# Patient Record
Sex: MALE | Race: WHITE | NOT HISPANIC OR LATINO | Employment: FULL TIME | URBAN - METROPOLITAN AREA
[De-identification: names, ages, dates, MRNs, and addresses within clinical notes are randomized per-mention and may not be internally consistent; named-entity substitution may affect disease eponyms.]

---

## 2019-08-16 ENCOUNTER — OFFICE VISIT (OUTPATIENT)
Dept: GASTROENTEROLOGY | Facility: CLINIC | Age: 39
End: 2019-08-16
Payer: COMMERCIAL

## 2019-08-16 VITALS
HEART RATE: 52 BPM | WEIGHT: 282 LBS | DIASTOLIC BLOOD PRESSURE: 80 MMHG | HEIGHT: 77 IN | SYSTOLIC BLOOD PRESSURE: 160 MMHG | BODY MASS INDEX: 33.3 KG/M2

## 2019-08-16 DIAGNOSIS — Z12.11 COLON CANCER SCREENING: ICD-10-CM

## 2019-08-16 DIAGNOSIS — R10.13 EPIGASTRIC PAIN: ICD-10-CM

## 2019-08-16 DIAGNOSIS — T88.7XXA SIDE EFFECT OF DRUG: ICD-10-CM

## 2019-08-16 DIAGNOSIS — K59.00 CONSTIPATION, UNSPECIFIED CONSTIPATION TYPE: ICD-10-CM

## 2019-08-16 DIAGNOSIS — R12 HEARTBURN: Primary | ICD-10-CM

## 2019-08-16 PROBLEM — F17.200 CURRENT EVERY DAY SMOKER: Status: ACTIVE | Noted: 2019-08-16

## 2019-08-16 PROCEDURE — 99244 OFF/OP CNSLTJ NEW/EST MOD 40: CPT | Performed by: INTERNAL MEDICINE

## 2019-08-16 RX ORDER — MULTIVITAMIN
1 CAPSULE ORAL DAILY
COMMUNITY

## 2019-08-16 RX ORDER — PANTOPRAZOLE SODIUM 40 MG/1
40 TABLET, DELAYED RELEASE ORAL DAILY
COMMUNITY
End: 2019-08-16 | Stop reason: SDUPTHER

## 2019-08-16 RX ORDER — CHLORAL HYDRATE 500 MG
1000 CAPSULE ORAL DAILY
COMMUNITY

## 2019-08-16 RX ORDER — PANTOPRAZOLE SODIUM 40 MG/1
40 TABLET, DELAYED RELEASE ORAL DAILY
Start: 2019-08-16 | End: 2019-10-23 | Stop reason: ALTCHOICE

## 2019-08-16 NOTE — PATIENT INSTRUCTIONS
Gastroesophageal Reflux Disease   WHAT YOU NEED TO KNOW:   Gastroesophageal reflux occurs when acid and food in the stomach back up into the esophagus  Gastroesophageal reflux disease (GERD) is reflux that occurs more than twice a week for a few weeks  It usually causes heartburn and other symptoms  GERD can cause other health problems over time if it is not treated  DISCHARGE INSTRUCTIONS:   Return to the emergency department if:   · You feel full and cannot burp or vomit  · You have severe chest pain and sudden trouble breathing  · Your bowel movements are black, bloody, or tarry-looking  · Your vomit looks like coffee grounds or has blood in it  Contact your healthcare provider if:   · You vomit large amounts, or you vomit often  · You have trouble breathing after you vomit  · You have trouble swallowing, or pain with swallowing  · You are losing weight without trying  · Your symptoms get worse or do not improve with treatment  · You have questions or concerns about your condition or care  Medicines:   · Medicines  are used to decrease stomach acid  Medicine may also be used to help your lower esophageal sphincter and stomach contract (tighten) more  · Take your medicine as directed  Contact your healthcare provider if you think your medicine is not helping or if you have side effects  Tell him of her if you are allergic to any medicine  Keep a list of the medicines, vitamins, and herbs you take  Include the amounts, and when and why you take them  Bring the list or the pill bottles to follow-up visits  Carry your medicine list with you in case of an emergency  Manage GERD:   · Do not have foods or drinks that may increase heartburn  These include chocolate, peppermint, fried or fatty foods, drinks that contain caffeine, or carbonated drinks (soda)  Other foods include spicy foods, onions, tomatoes, and tomato-based foods   Do not have foods or drinks that can irritate your esophagus, such as citrus fruits, juices, and alcohol  · Do not eat large meals  When you eat a lot of food at one time, your stomach needs more acid to digest it  Eat 6 small meals each day instead of 3 large ones, and eat slowly  Do not eat meals 2 to 3 hours before bedtime  · Elevate the head of your bed  Place 6-inch blocks under the head of your bed frame  You may also use more than one pillow under your head and shoulders while you sleep  · Maintain a healthy weight  If you are overweight, weight loss may help relieve symptoms of GERD  · Do not smoke  Smoking weakens the lower esophageal sphincter and increases the risk of GERD  Ask your healthcare provider for information if you currently smoke and need help to quit  E-cigarettes or smokeless tobacco still contain nicotine  Talk to your healthcare provider before you use these products  · Do not wear clothing that is tight around your waist   Tight clothing can put pressure on your stomach and cause or worsen GERD symptoms  Follow up with your healthcare provider as directed:  Write down your questions so you remember to ask them during your visits  © 2017 [facility] Information is for End User's use only and may not be sold, redistributed or otherwise used for commercial purposes  All illustrations and images included in CareNotes® are the copyrighted property of [employer]  or [employer]un [employer]  The above information is an  only  It is not intended as medical advice for individual conditions or treatments  Talk to your doctor, nurse or pharmacist before following any medical regimen to see if it is safe and effective for you

## 2019-08-16 NOTE — LETTER
August 16, 2019     Marck Puente, [address] [facility] [address] [facility]    Patient: Lor Guillermo   YOB: 1980   Date of Visit: 8/16/2019       Dear Dr Keyanna Rosa:    Thank you for referring Lor Guillermo to me for evaluation  Below are my notes for this consultation  If you have questions, please do not hesitate to call me  I look forward to following your patient along with you  Sincerely,        Juan Jose Mendoza MD        CC: No Recipients  Juan Jose Mendoza MD  8/16/2019  4:06 PM  Sign at close encounter    [facility] - Outpatient Consultation  Lor Guillermo 43 y o  male MRN: [id number]  Encounter: [id number]    ASSESSMENT AND PLAN:      1  Heartburn  27-year-old male with 6 weeks of new onset heartburn and epigastric pain  No NSAIDs  It appears that this was all triggered by a viral event  Likely some reflux  However, given his worsening pain, we will proceed with upper endoscopy to rule out peptic ulcer disease, esophagitis  Will also obtain an ultrasound and blood work to further evaluate his pain, to rule out biliary etiologies  - pantoprazole (PROTONIX) 40 mg tablet; Take 1 tablet (40 mg total) by mouth daily    2  Epigastric pain    - pantoprazole (PROTONIX) 40 mg tablet; Take 1 tablet (40 mg total) by mouth daily  - Comprehensive metabolic panel; Future  - CBC and differential; Future  - Lipase; Future    3  Side effect of drug  I do not think that his fever chills and the numbness were due to the Protonix  Especially since he has been on it back for the past 4 days without any issues  At this point, I recommend that we can continue it until the endoscopy  If he experiences any other new symptoms, we can switch the pantoprazole to omeprazole  4  Constipation, unspecified constipation type  Intermittent constipation issues  Does not wish to take any medicines  Increased water and high-fiber diet recommended      5  Colon cancer screening  Average risk      Followup Appointment[de-identified]  2 months  ______________________________________________________________________    Chief Complaint   Patient presents with    Heartburn       HPI:   Vito Saldana is a 43y o  year old male who presents today as a new patient at the request of Dr Shala Patrick for heartburn  Patient states that for the past year he has been under lot of stress as his in-laws moved in with him  However, his weight has been stable  He intermittently remembers taking some Gaviscon or Tums as needed over the past few years, possibly a few times a year  However, for the past 6 weeks, he has noticed worsening heartburn  It all started after a barbecue on July 4th  The heartburn is associated with severe epigastric sharp abdominal pain, radiating to the right upper quadrant  No change with movement or eating  Improved on pantoprazole  No associated nausea or vomiting  Intermittently gets constipated, was given MiraLax in the past but does not take it  No GI bleeding  Of note, he is primary care doctor gave him pantoprazole  Afterwards, he had a couple episodes where he had 3 days of fevers and chills  He also had couple days in the past 6 weeks of numbness in his face      Historical Information   Past Medical History:   Diagnosis Date    GERD (gastroesophageal reflux disease)     Smoker      Past Surgical History:   Procedure Laterality Date    LASIK      WISDOM TOOTH EXTRACTION       Social History     Substance and Sexual Activity   Alcohol Use Yes    Frequency: 2-4 times a month    Drinks per session: 1 or 2    Binge frequency: Less than monthly     Social History     Substance and Sexual Activity   Drug Use Never     Social History     Tobacco Use   Smoking Status Light Tobacco Smoker    Types: Cigars   Smokeless Tobacco Never Used     Family History   Problem Relation Age of Onset    Diabetes Mother     Colon cancer Neg Hx     Colon polyps Neg Hx Meds/Allergies     Current Outpatient Medications:     Multiple Vitamin (MULTIVITAMIN) capsule    Omega-3 Fatty Acids (FISH OIL) 1,000 mg    pantoprazole (PROTONIX) 40 mg tablet    No Known Allergies    PHYSICAL EXAM:    Blood pressure 160/80, pulse (!) 52, height 6' 5" (1 956 m), weight 128 kg (282 lb)  Body mass index is 33 44 kg/m²  General Appearance: NAD, cooperative, alert  Eyes: Anicteric, PERRLA, EOMI  ENT:  Normocephalic, atraumatic, normal mucosa  Neck:  Supple, symmetrical, trachea midline  Resp:  Clear to auscultation bilaterally; no rales, rhonchi or wheezing; respirations unlabored   CV:  S1 S2, Regular rate and rhythm; no murmur, rub, or gallop  GI:  Soft, non-tender, non-distended; normal bowel sounds; no masses, no organomegaly   Rectal: Deferred  :  Deferred   Musculoskeletal: No cyanosis, clubbing or edema  Normal ROM  Skin:  No jaundice, rashes, or lesions   Heme/Lymph: No palpable cervical lymphadenopathy  Psych: Normal affect, good eye contact  Neuro: No gross deficits, AAOx3    Lab Results:   Normal CBC    Radiology Results:   No results found  REVIEW OF SYSTEMS:    CONSTITUTIONAL:  2 days of fever, chills, rigors  5 lb weight loss  HEENT: No earache or tinnitus  Denies hearing loss or visual disturbances  CARDIOVASCULAR: No chest pain or palpitations  RESPIRATORY: Denies any cough, hemoptysis, shortness of breath or dyspnea on exertion  GASTROINTESTINAL: As noted in the History of Present Illness  GENITOURINARY: No problems with urination  Denies any hematuria or dysuria  NEUROLOGIC: No dizziness or vertigo, denies headaches  MUSCULOSKELETAL: Denies any muscle or joint pain  SKIN: Denies skin rashes or itching  ENDOCRINE: Denies excessive thirst  Denies intolerance to heat or cold  PSYCHOSOCIAL: Denies depression or anxiety  Denies any recent memory loss

## 2019-08-16 NOTE — PROGRESS NOTES
[address] [facility] - Outpatient Consultation  Talisha Cordova 43 y o  male MRN: [id number]  Encounter: [id number]    ASSESSMENT AND PLAN:      1  Heartburn  67-year-old male with 6 weeks of new onset heartburn and epigastric pain  No NSAIDs  It appears that this was all triggered by a viral event  Likely some reflux  However, given his worsening pain, we will proceed with upper endoscopy to rule out peptic ulcer disease, esophagitis  Will also obtain an ultrasound and blood work to further evaluate his pain, to rule out biliary etiologies  - pantoprazole (PROTONIX) 40 mg tablet; Take 1 tablet (40 mg total) by mouth daily    2  Epigastric pain    - pantoprazole (PROTONIX) 40 mg tablet; Take 1 tablet (40 mg total) by mouth daily  - Comprehensive metabolic panel; Future  - CBC and differential; Future  - Lipase; Future    3  Side effect of drug  I do not think that his fever chills and the numbness were due to the Protonix  Especially since he has been on it back for the past 4 days without any issues  At this point, I recommend that we can continue it until the endoscopy  If he experiences any other new symptoms, we can switch the pantoprazole to omeprazole  4  Constipation, unspecified constipation type  Intermittent constipation issues  Does not wish to take any medicines  Increased water and high-fiber diet recommended  5  Colon cancer screening  Average risk      Followup Appointment[de-identified]  2 months  ______________________________________________________________________    Chief Complaint   Patient presents with    Heartburn       HPI:   Talisha Cordova is a 43y o  year old male who presents today as a new patient at the request of Dr Louie Hamlin for heartburn  Patient states that for the past year he has been under lot of stress as his in-laws moved in with him  However, his weight has been stable    He intermittently remembers taking some Gaviscon or Tums as needed over the past few years, possibly a few times a year  However, for the past 6 weeks, he has noticed worsening heartburn  It all started after a barbecue on July 4th  The heartburn is associated with severe epigastric sharp abdominal pain, radiating to the right upper quadrant  No change with movement or eating  Improved on pantoprazole  No associated nausea or vomiting  Intermittently gets constipated, was given MiraLax in the past but does not take it  No GI bleeding  Of note, he is primary care doctor gave him pantoprazole  Afterwards, he had a couple episodes where he had 3 days of fevers and chills  He also had couple days in the past 6 weeks of numbness in his face  Historical Information   Past Medical History:   Diagnosis Date    GERD (gastroesophageal reflux disease)     Smoker      Past Surgical History:   Procedure Laterality Date    LASIK      WISDOM TOOTH EXTRACTION       Social History     Substance and Sexual Activity   Alcohol Use Yes    Frequency: 2-4 times a month    Drinks per session: 1 or 2    Binge frequency: Less than monthly     Social History     Substance and Sexual Activity   Drug Use Never     Social History     Tobacco Use   Smoking Status Light Tobacco Smoker    Types: Cigars   Smokeless Tobacco Never Used     Family History   Problem Relation Age of Onset    Diabetes Mother     Colon cancer Neg Hx     Colon polyps Neg Hx        Meds/Allergies     Current Outpatient Medications:     Multiple Vitamin (MULTIVITAMIN) capsule    Omega-3 Fatty Acids (FISH OIL) 1,000 mg    pantoprazole (PROTONIX) 40 mg tablet    No Known Allergies    PHYSICAL EXAM:    Blood pressure 160/80, pulse (!) 52, height 6' 5" (1 956 m), weight 128 kg (282 lb)  Body mass index is 33 44 kg/m²  General Appearance: NAD, cooperative, alert  Eyes: Anicteric, PERRLA, EOMI  ENT:  Normocephalic, atraumatic, normal mucosa      Neck:  Supple, symmetrical, trachea midline  Resp:  Clear to auscultation bilaterally; no rales, rhonchi or wheezing; respirations unlabored   CV:  S1 S2, Regular rate and rhythm; no murmur, rub, or gallop  GI:  Soft, non-tender, non-distended; normal bowel sounds; no masses, no organomegaly   Rectal: Deferred  :  Deferred   Musculoskeletal: No cyanosis, clubbing or edema  Normal ROM  Skin:  No jaundice, rashes, or lesions   Heme/Lymph: No palpable cervical lymphadenopathy  Psych: Normal affect, good eye contact  Neuro: No gross deficits, AAOx3    Lab Results:   Normal CBC    Radiology Results:   No results found  REVIEW OF SYSTEMS:    CONSTITUTIONAL:  2 days of fever, chills, rigors  5 lb weight loss  HEENT: No earache or tinnitus  Denies hearing loss or visual disturbances  CARDIOVASCULAR: No chest pain or palpitations  RESPIRATORY: Denies any cough, hemoptysis, shortness of breath or dyspnea on exertion  GASTROINTESTINAL: As noted in the History of Present Illness  GENITOURINARY: No problems with urination  Denies any hematuria or dysuria  NEUROLOGIC: No dizziness or vertigo, denies headaches  MUSCULOSKELETAL: Denies any muscle or joint pain  SKIN: Denies skin rashes or itching  ENDOCRINE: Denies excessive thirst  Denies intolerance to heat or cold  PSYCHOSOCIAL: Denies depression or anxiety  Denies any recent memory loss

## 2019-08-19 ENCOUNTER — TELEPHONE (OUTPATIENT)
Dept: GASTROENTEROLOGY | Facility: CLINIC | Age: 39
End: 2019-08-19

## 2019-08-19 DIAGNOSIS — R10.13 EPIGASTRIC PAIN: Primary | ICD-10-CM

## 2019-08-19 NOTE — TELEPHONE ENCOUNTER
Ultrasound order prepared and faxed to [facility] pt registration, corrected lab orders placed with [facility] as resulting lab

## 2019-08-19 NOTE — TELEPHONE ENCOUNTER
Pt left  mssg stating he was in Fri and told he needs to do blood work for his liver; asks for [phone] to let him know if he needs to fast      Called Pt back to advise no fasting required  Pt will go to [facility] for Labs (new order to be re-sent)  Pt will go to [facility] for US; doesn't know if he needs a pre cert or not  Provided  Central Scheduling ph #  He will plan on going end of this wk for testing

## 2019-08-29 LAB
ALBUMIN SERPL-MCNC: 4.3 G/DL (ref 3.6–5.1)
ALBUMIN/GLOB SERPL: 1.9 (CALC) (ref 1–2.5)
ALP SERPL-CCNC: 50 U/L (ref 40–115)
ALT SERPL-CCNC: 11 U/L (ref 9–46)
AST SERPL-CCNC: 19 U/L (ref 10–40)
BASOPHILS # BLD AUTO: 48 CELLS/UL (ref 0–200)
BASOPHILS NFR BLD AUTO: 0.7 %
BILIRUB SERPL-MCNC: 0.7 MG/DL (ref 0.2–1.2)
BUN SERPL-MCNC: 17 MG/DL (ref 7–25)
BUN/CREAT SERPL: NORMAL (CALC) (ref 6–22)
CALCIUM SERPL-MCNC: 9.4 MG/DL (ref 8.6–10.3)
CHLORIDE SERPL-SCNC: 104 MMOL/L (ref 98–110)
CO2 SERPL-SCNC: 29 MMOL/L (ref 20–32)
CREAT SERPL-MCNC: 1.21 MG/DL (ref 0.6–1.35)
EOSINOPHIL # BLD AUTO: 354 CELLS/UL (ref 15–500)
EOSINOPHIL NFR BLD AUTO: 5.2 %
ERYTHROCYTE [DISTWIDTH] IN BLOOD BY AUTOMATED COUNT: 12.6 % (ref 11–15)
GLOBULIN SER CALC-MCNC: 2.3 G/DL (CALC) (ref 1.9–3.7)
GLUCOSE SERPL-MCNC: 83 MG/DL (ref 65–139)
HCT VFR BLD AUTO: 44.8 % (ref 38.5–50)
HGB BLD-MCNC: 15.5 G/DL (ref 13.2–17.1)
LIPASE SERPL-CCNC: 44 U/L (ref 7–60)
LYMPHOCYTES # BLD AUTO: 2421 CELLS/UL (ref 850–3900)
LYMPHOCYTES NFR BLD AUTO: 35.6 %
MCH RBC QN AUTO: 31.8 PG (ref 27–33)
MCHC RBC AUTO-ENTMCNC: 34.6 G/DL (ref 32–36)
MCV RBC AUTO: 92 FL (ref 80–100)
MONOCYTES # BLD AUTO: 503 CELLS/UL (ref 200–950)
MONOCYTES NFR BLD AUTO: 7.4 %
NEUTROPHILS # BLD AUTO: 3475 CELLS/UL (ref 1500–7800)
NEUTROPHILS NFR BLD AUTO: 51.1 %
PLATELET # BLD AUTO: 134 THOUSAND/UL (ref 140–400)
PMV BLD REES-ECKER: 12.4 FL (ref 7.5–12.5)
POTASSIUM SERPL-SCNC: 4.7 MMOL/L (ref 3.5–5.3)
PROT SERPL-MCNC: 6.6 G/DL (ref 6.1–8.1)
RBC # BLD AUTO: 4.87 MILLION/UL (ref 4.2–5.8)
SL AMB EGFR AFRICAN AMERICAN: 87 ML/MIN/1.73M2
SL AMB EGFR NON AFRICAN AMERICAN: 75 ML/MIN/1.73M2
SODIUM SERPL-SCNC: 140 MMOL/L (ref 135–146)
WBC # BLD AUTO: 6.8 THOUSAND/UL (ref 3.8–10.8)

## 2019-09-10 ENCOUNTER — TELEPHONE (OUTPATIENT)
Dept: GASTROENTEROLOGY | Facility: CLINIC | Age: 39
End: 2019-09-10

## 2019-09-10 NOTE — TELEPHONE ENCOUNTER
Called patient regarding results from EGD performed on September 9, 2019 for heartburn  Biopsies negative for H pylori or celiac  Stomach antrum gastric mucosa shows erosive gastritis  Patient on PPI therapy  Instructed patient to follow up with us in the office      RECALL:  None

## 2019-10-23 ENCOUNTER — OFFICE VISIT (OUTPATIENT)
Dept: GASTROENTEROLOGY | Facility: CLINIC | Age: 39
End: 2019-10-23
Payer: COMMERCIAL

## 2019-10-23 VITALS
BODY MASS INDEX: 33.53 KG/M2 | HEIGHT: 77 IN | WEIGHT: 284 LBS | DIASTOLIC BLOOD PRESSURE: 78 MMHG | SYSTOLIC BLOOD PRESSURE: 116 MMHG | HEART RATE: 44 BPM

## 2019-10-23 DIAGNOSIS — K21.9 GASTROESOPHAGEAL REFLUX DISEASE WITHOUT ESOPHAGITIS: Primary | ICD-10-CM

## 2019-10-23 DIAGNOSIS — K59.00 CONSTIPATION, UNSPECIFIED CONSTIPATION TYPE: ICD-10-CM

## 2019-10-23 DIAGNOSIS — Z12.11 COLON CANCER SCREENING: ICD-10-CM

## 2019-10-23 PROCEDURE — 99214 OFFICE O/P EST MOD 30 MIN: CPT | Performed by: INTERNAL MEDICINE

## 2019-10-23 NOTE — PROGRESS NOTES
[facility] - Outpatient Follow-up Note  Jaky Bassett 35 y o  male MRN: [id number]  Encounter: [id number]    ASSESSMENT AND PLAN:      1  Gastroesophageal reflux disease without esophagitis  53-year-old male with reflux, EGD with erosive gastritis status post 2 months of pantoprazole  Overall improved with GERD lifestyle modifications and he weaned himself off the pantoprazole  - GERD lifestyle modifications  - weight loss discussed    2  Constipation, unspecified constipation type  Improved with high-fiber diet and increasing water    3  Colon cancer screening  Average risk, can started age 47      Followup Appointment:  As needed  ______________________________________________________________________    Chief Complaint   Patient presents with    Follow up to EGD     HPI:  53-year-old male here today for follow-up  EGD showed erosive gastritis  Biopsies negative  Did 2 months of pantoprazole with resolution of symptoms  He did do some trials of dietary triggers  He found that eating late at night and eating carbs worsened his symptoms  Since making those dietary changes, his symptoms have been improved  He weaned himself off the PPI  No nausea vomiting  No heartburn  Denies smoking  No alcohol      Historical Information   Past Medical History:   Diagnosis Date    GERD (gastroesophageal reflux disease)     Smoker      Past Surgical History:   Procedure Laterality Date    LASIK      WISDOM TOOTH EXTRACTION       Social History     Substance and Sexual Activity   Alcohol Use Yes    Frequency: 2-4 times a month    Drinks per session: 1 or 2    Binge frequency: Less than monthly     Social History     Substance and Sexual Activity   Drug Use Never     Social History     Tobacco Use   Smoking Status Light Tobacco Smoker    Types: Cigars   Smokeless Tobacco Never Used     Family History   Problem Relation Age of Onset    Diabetes Mother     Colon cancer Neg Hx     Colon polyps Neg Hx          Current Outpatient Medications:     Multiple Vitamin (MULTIVITAMIN) capsule    Omega-3 Fatty Acids (FISH OIL) 1,000 mg  No Known Allergies  Reviewed medications and allergies and updated as indicated    PHYSICAL EXAM:    Blood pressure 116/78, pulse (!) 44, height 6' 5" (1 956 m), weight 129 kg (284 lb)  Body mass index is 33 68 kg/m²  General Appearance: NAD, cooperative, alert  Eyes: Anicteric, PERRLA, EOMI  ENT:  Normocephalic, atraumatic, normal mucosa  Neck:  Supple, symmetrical, trachea midline  Resp:  Clear to auscultation bilaterally; no rales, rhonchi or wheezing; respirations unlabored   CV:  S1 S2, Regular rate and rhythm; no murmur, rub, or gallop  GI:  Soft, non-tender, non-distended; normal bowel sounds; no masses, no organomegaly   Rectal: Deferred  Musculoskeletal: No cyanosis, clubbing or edema  Normal ROM  Skin:  No jaundice, rashes, or lesions   Heme/Lymph: No palpable cervical lymphadenopathy  Psych: Normal affect, good eye contact  Neuro: No gross deficits, AAOx3    Lab Results:   Lab Results   Component Value Date    WBC 6 8 08/28/2019    HGB 15 5 08/28/2019    HCT 44 8 08/28/2019    MCV 92 0 08/28/2019     (L) 08/28/2019     Lab Results   Component Value Date    K 4 7 08/28/2019     08/28/2019    CO2 29 08/28/2019    BUN 17 08/28/2019    CREATININE 1 21 08/28/2019    CALCIUM 9 4 08/28/2019    AST 19 08/28/2019    ALT 11 08/28/2019    ALKPHOS 50 08/28/2019     No results found for: IRON, TIBC, FERRITIN  Lab Results   Component Value Date    LIPASE 44 08/28/2019       Radiology Results:   No results found

## 2019-10-23 NOTE — PATIENT INSTRUCTIONS
Gastroesophageal Reflux Disease   WHAT YOU NEED TO KNOW:   Gastroesophageal reflux occurs when acid and food in the stomach back up into the esophagus  Gastroesophageal reflux disease (GERD) is reflux that occurs more than twice a week for a few weeks  It usually causes heartburn and other symptoms  GERD can cause other health problems over time if it is not treated  DISCHARGE INSTRUCTIONS:   Return to the emergency department if:   · You feel full and cannot burp or vomit  · You have severe chest pain and sudden trouble breathing  · Your bowel movements are black, bloody, or tarry-looking  · Your vomit looks like coffee grounds or has blood in it  Contact your healthcare provider if:   · You vomit large amounts, or you vomit often  · You have trouble breathing after you vomit  · You have trouble swallowing, or pain with swallowing  · You are losing weight without trying  · Your symptoms get worse or do not improve with treatment  · You have questions or concerns about your condition or care  Medicines:   · Medicines  are used to decrease stomach acid  Medicine may also be used to help your lower esophageal sphincter and stomach contract (tighten) more  · Take your medicine as directed  Contact your healthcare provider if you think your medicine is not helping or if you have side effects  Tell him of her if you are allergic to any medicine  Keep a list of the medicines, vitamins, and herbs you take  Include the amounts, and when and why you take them  Bring the list or the pill bottles to follow-up visits  Carry your medicine list with you in case of an emergency  Manage GERD:   · Do not have foods or drinks that may increase heartburn  These include chocolate, peppermint, fried or fatty foods, drinks that contain caffeine, or carbonated drinks (soda)  Other foods include spicy foods, onions, tomatoes, and tomato-based foods   Do not have foods or drinks that can irritate your esophagus, such as citrus fruits, juices, and alcohol  · Do not eat large meals  When you eat a lot of food at one time, your stomach needs more acid to digest it  Eat 6 small meals each day instead of 3 large ones, and eat slowly  Do not eat meals 2 to 3 hours before bedtime  · Elevate the head of your bed  Place 6-inch blocks under the head of your bed frame  You may also use more than one pillow under your head and shoulders while you sleep  · Maintain a healthy weight  If you are overweight, weight loss may help relieve symptoms of GERD  · Do not smoke  Smoking weakens the lower esophageal sphincter and increases the risk of GERD  Ask your healthcare provider for information if you currently smoke and need help to quit  E-cigarettes or smokeless tobacco still contain nicotine  Talk to your healthcare provider before you use these products  · Do not wear clothing that is tight around your waist   Tight clothing can put pressure on your stomach and cause or worsen GERD symptoms  Follow up with your healthcare provider as directed:  Write down your questions so you remember to ask them during your visits  © 2017 [facility] Information is for End User's use only and may not be sold, redistributed or otherwise used for commercial purposes  All illustrations and images included in CareNotes® are the copyrighted property of [employer]  or [employer]un Reyna  The above information is an  only  It is not intended as medical advice for individual conditions or treatments  Talk to your doctor, nurse or pharmacist before following any medical regimen to see if it is safe and effective for you

## 2019-10-23 NOTE — LETTER
October 23, 2019     Vishnu Reyez, [address] [facility] [id number] [facility]    Patient: Tomas Cheek   YOB: 1980   Date of Visit: 10/23/2019       Dear Dr Ayla Rivas:    Thank you for referring Tomas Cheek to me for evaluation  Below are my notes for this consultation  If you have questions, please do not hesitate to call me  I look forward to following your patient along with you  Sincerely,        Cici Loza MD        CC: No Recipients  Cici Loza MD  10/23/2019  4:48 PM  Sign at close encounter  [address] [email or web address] - Outpatient Follow-up Note  Tomas Cheek 35 y o  male MRN: [id number]  Encounter: [id number]    ASSESSMENT AND PLAN:      1  Gastroesophageal reflux disease without esophagitis  43-year-old male with reflux, EGD with erosive gastritis status post 2 months of pantoprazole  Overall improved with GERD lifestyle modifications and he weaned himself off the pantoprazole  - GERD lifestyle modifications  - weight loss discussed    2  Constipation, unspecified constipation type  Improved with high-fiber diet and increasing water    3  Colon cancer screening  Average risk, can started age 47      Followup Appointment:  As needed  ______________________________________________________________________    Chief Complaint   Patient presents with    Follow up to EGD     HPI:  43-year-old male here today for follow-up  EGD showed erosive gastritis  Biopsies negative  Did 2 months of pantoprazole with resolution of symptoms  He did do some trials of dietary triggers  He found that eating late at night and eating carbs worsened his symptoms  Since making those dietary changes, his symptoms have been improved  He weaned himself off the PPI  No nausea vomiting  No heartburn  Denies smoking  No alcohol      Historical Information   Past Medical History:   Diagnosis Date    GERD (gastroesophageal reflux disease)     Smoker      Past Surgical History:   Procedure Laterality Date    LASIK      WISDOM TOOTH EXTRACTION       Social History     Substance and Sexual Activity   Alcohol Use Yes    Frequency: 2-4 times a month    Drinks per session: 1 or 2    Binge frequency: Less than monthly     Social History     Substance and Sexual Activity   Drug Use Never     Social History     Tobacco Use   Smoking Status Light Tobacco Smoker    Types: Cigars   Smokeless Tobacco Never Used     Family History   Problem Relation Age of Onset    Diabetes Mother     Colon cancer Neg Hx     Colon polyps Neg Hx          Current Outpatient Medications:     Multiple Vitamin (MULTIVITAMIN) capsule    Omega-3 Fatty Acids (FISH OIL) 1,000 mg  No Known Allergies  Reviewed medications and allergies and updated as indicated    PHYSICAL EXAM:    Blood pressure 116/78, pulse (!) 44, height 6' 5" (1 956 m), weight 129 kg (284 lb)  Body mass index is 33 68 kg/m²  General Appearance: NAD, cooperative, alert  Eyes: Anicteric, PERRLA, EOMI  ENT:  Normocephalic, atraumatic, normal mucosa  Neck:  Supple, symmetrical, trachea midline  Resp:  Clear to auscultation bilaterally; no rales, rhonchi or wheezing; respirations unlabored   CV:  S1 S2, Regular rate and rhythm; no murmur, rub, or gallop  GI:  Soft, non-tender, non-distended; normal bowel sounds; no masses, no organomegaly   Rectal: Deferred  Musculoskeletal: No cyanosis, clubbing or edema  Normal ROM    Skin:  No jaundice, rashes, or lesions   Heme/Lymph: No palpable cervical lymphadenopathy  Psych: Normal affect, good eye contact  Neuro: No gross deficits, AAOx3    Lab Results:   Lab Results   Component Value Date    WBC 6 8 08/28/2019    HGB 15 5 08/28/2019    HCT 44 8 08/28/2019    MCV 92 0 08/28/2019     (L) 08/28/2019     Lab Results   Component Value Date    K 4 7 08/28/2019     08/28/2019    CO2 29 08/28/2019    BUN 17 08/28/2019    CREATININE 1 21 08/28/2019    CALCIUM 9 4 08/28/2019    AST 19 08/28/2019    ALT 11 08/28/2019    ALKPHOS 50 08/28/2019     No results found for: IRON, TIBC, FERRITIN  Lab Results   Component Value Date    LIPASE 44 08/28/2019       Radiology Results:   No results found

## 2024-02-21 PROBLEM — Z12.11 COLON CANCER SCREENING: Status: RESOLVED | Noted: 2019-08-16 | Resolved: 2024-02-21

## 2024-11-08 ENCOUNTER — TELEPHONE (OUTPATIENT)
Dept: GASTROENTEROLOGY | Facility: CLINIC | Age: 44
End: 2024-11-08

## 2024-11-08 NOTE — TELEPHONE ENCOUNTER
Patient would like me to call back on Monday.  I need OA/ASC questions for procedure on 11/18 with Dr. March at [facility]

## 2024-11-11 ENCOUNTER — PREP FOR PROCEDURE (OUTPATIENT)
Dept: GASTROENTEROLOGY | Facility: CLINIC | Age: 44
End: 2024-11-11

## 2024-11-11 DIAGNOSIS — Z12.11 SCREENING FOR COLON CANCER: Primary | ICD-10-CM

## 2024-11-11 NOTE — TELEPHONE ENCOUNTER
11/11/24  Screened by: Barbi Baca    Referring Provider Kimber Ward    Pre- Screening:     There is no height or weight on file to calculate BMI.  Has patient been referred for a routine screening Colonoscopy? yes  Is the patient between 45-75 years old? yes      Previous Colonoscopy yes   If yes:    Date: doesn't remember    Facility:     Reason:       SCHEDULING STAFF: If the patient is between 45yrs-49yrs, please advise patient to confirm benefits/coverage with their insurance company for a routine screening colonoscopy, some insurance carriers will only cover at 50yrs or older. If the patient is over 75years old, please schedule an office visit.     Does the patient want to see a Gastroenterologist prior to their procedure OR are they having any GI symptoms? no    Has the patient been hospitalized or had abdominal surgery in the past 6 months? yes    Does the patient use supplemental oxygen? no    Does the patient take Coumadin, Lovenox, Plavix, Elliquis, Xarelto, or other blood thinning medication? no    Has the patient had a stroke, cardiac event, or stent placed in the past year? no    SCHEDULING STAFF: If patient answers NO to above questions, then schedule procedure. If patient answers YES to above questions, then schedule office appointment.     If patient is between 45yrs - 49yrs, please advise patient that we will have to confirm benefits & coverage with their insurance company for a routine screening colonoscopy.

## 2024-11-11 NOTE — TELEPHONE ENCOUNTER
Scheduled date of colonoscopy (as of today): 11/18/24  Physician performing colonoscopy: Dr. March  Location of colonoscopy: [facility]  Bowel prep reviewed with patient: Miralax  Instructions reviewed with patient by: Barbi Baca  Clearances: none    **anesthesia reviewed [facility] records-patient recently diagnosed with Brain tumor and said it was okay to move forward.

## 2024-11-12 ENCOUNTER — ANESTHESIA EVENT (OUTPATIENT)
Dept: ANESTHESIOLOGY | Facility: AMBULATORY SURGERY CENTER | Age: 44
End: 2024-11-12

## 2024-11-12 ENCOUNTER — ANESTHESIA (OUTPATIENT)
Dept: ANESTHESIOLOGY | Facility: AMBULATORY SURGERY CENTER | Age: 44
End: 2024-11-12

## 2024-11-14 ENCOUNTER — TELEPHONE (OUTPATIENT)
Dept: GASTROENTEROLOGY | Facility: AMBULATORY SURGERY CENTER | Age: 44
End: 2024-11-14

## 2024-11-14 NOTE — PROGRESS NOTES
Called to review instructions for 11/18 colonoscopy, patient states would like to reschedule, has too much going on at this time. Information given to schedulers.

## 2024-11-14 NOTE — TELEPHONE ENCOUNTER
Scheduled date of colonoscopy (as of today):03/31/25  Physician performing colonoscopy:Dr March  Location of colonoscopy: [facility]  Bowel prep reviewed with patient: Miralax/dulcolax  Patient has instructions   Clearances: none